# Patient Record
Sex: FEMALE | Race: BLACK OR AFRICAN AMERICAN | Employment: UNEMPLOYED | URBAN - METROPOLITAN AREA
[De-identification: names, ages, dates, MRNs, and addresses within clinical notes are randomized per-mention and may not be internally consistent; named-entity substitution may affect disease eponyms.]

---

## 2018-09-02 ENCOUNTER — HOSPITAL ENCOUNTER (EMERGENCY)
Age: 23
Discharge: HOME | End: 2018-09-02
Attending: EMERGENCY MEDICINE
Payer: MEDICAID

## 2018-09-02 VITALS
BODY MASS INDEX: 22.98 KG/M2 | HEIGHT: 59 IN | RESPIRATION RATE: 16 BRPM | WEIGHT: 114 LBS | HEART RATE: 89 BPM | TEMPERATURE: 98.2 F | SYSTOLIC BLOOD PRESSURE: 104 MMHG | OXYGEN SATURATION: 99 % | DIASTOLIC BLOOD PRESSURE: 71 MMHG

## 2018-09-02 DIAGNOSIS — L30.9 ECZEMA, UNSPECIFIED TYPE: Primary | ICD-10-CM

## 2018-09-02 PROCEDURE — 99282 EMERGENCY DEPT VISIT SF MDM: CPT

## 2018-09-02 RX ORDER — PREDNISONE 10 MG/1
TABLET ORAL
Qty: 21 TAB | Refills: 0 | Status: SHIPPED | OUTPATIENT
Start: 2018-09-02

## 2018-09-02 NOTE — LETTER
[facility] 
THE [facility] EMERGENCY DEPT 
[address] [address] 
[phone] Work/School Note Date: 9/2/2018 To Whom It May concern: 
 
Shefali Barriga was seen and treated today in the emergency room by the following provider(s): 
Attending Provider: Vilma Joe MD 
Physician Assistant: CAMRYN Meza. Shefali Barriga may return to work on Wednesday, September 5, 2018. Sincerely, CAMRYN Meza

## 2018-09-02 NOTE — ED PROVIDER NOTES
EMERGENCY DEPARTMENT HISTORY AND PHYSICAL EXAM 
 
Date: 9/2/2018 Patient Name: Joie Phoenix History of Presenting Illness Chief Complaint Patient presents with  
 Skin Problem History Provided By: Patient Chief Complaint: Rash Duration: 2 Days Timing:  Worsening Location: Generalized to entire skin surface Quality: Pruritic Severity: Severe Modifying Factors: Not relieved with Zyrtec Associated Symptoms: bump with pus on right anterior thigh (resolved) Additional History (Context):  
5:27 PM 
Joie Phoenix is a 24 y.o. female with PMHX eczema presents to the emergency department C/O worsening rash onset two days ago not relieved with Zyrtec. She cannot think of anything that would have triggered this episode: no new foods, medicines, or detergents. Pt states she is currently breastfeeding. She does not have a dermatologist here, and notes she moved to the area here a year ago. She has previously had one round of Prednisone for an episode on her anterior thighs. Pt denies drainage from any of her skin lesions, and any other sxs or complaints. No fevers. No tongue throat or lip swelling. No difficulty breathing PCP: Syeda Melvin MD 
 
 
 
Past History Past Medical History: 
History reviewed. No pertinent past medical history. Past Surgical History: 
History reviewed. No pertinent surgical history. Family History: 
History reviewed. No pertinent family history. Social History: 
Social History Substance Use Topics  Smoking status: None  Smokeless tobacco: None  Alcohol use None Allergies: 
No Known Allergies Review of Systems Review of Systems Constitutional: Negative for chills and fever. Skin: Positive for rash (generalized to entire skin surface). Negative for wound. (+) bump with pus on right anterior thigh (resolved) All other systems reviewed and are negative. Physical Exam  
 
Vitals:  
 09/02/18 1717 BP: 104/71 Pulse: 89 Resp: 16 Temp: 98.2 °F (36.8 °C) SpO2: 99% Weight: 51.7 kg (114 lb) Height: 4' 11\" (1.499 m) Physical Exam  
Constitutional: She is oriented to person, place, and time. She appears well-developed and well-nourished. No distress. Pt appears well sitting up in bed in no distress, speaking in full sentences without evidence of dyspnea, increased work of breathing or any obvious pain at this time HENT:  
Head: Normocephalic and atraumatic. Right Ear: Hearing, tympanic membrane, external ear and ear canal normal.  
Left Ear: Hearing, tympanic membrane, external ear and ear canal normal.  
Nose: Nose normal.  
Mouth/Throat: Uvula is midline, oropharynx is clear and moist and mucous membranes are normal. Mucous membranes are not dry. No trismus in the jaw. No uvula swelling. No oropharyngeal exudate, posterior oropharyngeal edema, posterior oropharyngeal erythema or tonsillar abscesses. No face, tongue and swelling Airway patent, no throat swelling No intraoral lesions Neck: Normal range of motion. Neck supple. Pulmonary/Chest: Effort normal and breath sounds normal. No stridor. No respiratory distress. She has no wheezes. She has no rales. She exhibits no tenderness. Abdominal: Soft. Bowel sounds are normal.  
Neurological: She is alert and oriented to person, place, and time. Skin: Rash noted. Chronically dried thickened skin to the trunk, BUE and BLE, no areas of erythema or drainage Psychiatric: She has a normal mood and affect. Judgment normal.  
Nursing note and vitals reviewed. Diagnostic Study Results Labs: 
 No results found for this or any previous visit (from the past 12 hour(s)). Radiologic Studies: No orders to display CT Results  (Last 48 hours) None CXR Results  (Last 48 hours) None Medical Decision Making I am the first provider for this patient. I reviewed the vital signs, available nursing notes, past medical history, past surgical history, family history and social history. Vital Signs: Reviewed the patient's vital signs. Pulse Oximetry Analysis: 99% on RA Records Reviewed: Nursing Notes and Old Medical Records Procedures: 
Procedures ED Course:  
5:27 PM Initial assessment performed. The patients presenting problems have been discussed, and they are in agreement with the care plan formulated and outlined with them. I have encouraged them to ask questions as they arise throughout their visit. Pt presents with hx of eczema, acute flare x 2 days. She does not have a dermatologist in the area. She has been using all of her typical topical meds w/o relief. No new products, soaps,lotions, foods, meds. This is c/w her typical flares w/o any atypical sxs or signs of secondary cellulitis. She is breast feeding. Will tx with short course of prednisone and have her f/uwith derm. Strict return precautions given. Diagnosis and Disposition DISCHARGE NOTE: 
5:36 PM 
Carolin Cole's  results have been reviewed with her. She has been counseled regarding her diagnosis, treatment, and plan. She verbally conveys understanding and agreement of the signs, symptoms, diagnosis, treatment and prognosis and additionally agrees to follow up as discussed. She also agrees with the care-plan and conveys that all of her questions have been answered. I have also provided discharge instructions for her that include: educational information regarding their diagnosis and treatment, and list of reasons why they would want to return to the ED prior to their follow-up appointment, should her condition change. She has been provided with education for proper emergency department utilization. CLINICAL IMPRESSION: 
 
1. Eczema, unspecified type PLAN: 
1. D/C Home 2.   
Discharge Medication List as of 9/2/2018  5:37 PM  
  
 START taking these medications Details  
predniSONE (STERAPRED DS) 10 mg dose pack Take as directed, Print, Disp-21 Tab, R-0  
  
  
CONTINUE these medications which have NOT CHANGED Details  
cetirizine HCl (ZYRTEC PO) Take  by mouth., Historical Med 3. Follow-up Information Follow up With Details Comments Contact Info Richard Peters MD Schedule an appointment as soon as possible for a visit in 2 days Follow up with Dermatology [address] [facility] [address] [place] [address] 
[phone] [facility] EMERGENCY DEPT Go to As needed, If symptoms worsen [address] [place] [address] 
[phone]  
  
 
___________________________ Attestations:  
 
SCRIBE ATTESTATION: 
This note is prepared by Rosaura Capellan, acting as Scribe for Alma Woo PA-C. 
 
PROVIDER ATTESTATION: 
Alma Woo PA-C: The scribe's documentation has been prepared under my direction and personally reviewed by me in its entirety. I confirm that the note above accurately reflects all work, treatment, procedures, and medical decision making performed by me.  
___________________________

## 2018-09-02 NOTE — ED TRIAGE NOTES
Pt states that her eczema is flaring up really bad; itching to her face, arms, hands, legs; Happening for several weeks, taking zyrtec without much relief of itching;  Pt states she is breastfeeding so she was trying to hold off on taking a lot of medications but she just cant stand it anymore

## 2018-09-02 NOTE — DISCHARGE INSTRUCTIONS
